# Patient Record
Sex: MALE | Race: WHITE | NOT HISPANIC OR LATINO | URBAN - METROPOLITAN AREA
[De-identification: names, ages, dates, MRNs, and addresses within clinical notes are randomized per-mention and may not be internally consistent; named-entity substitution may affect disease eponyms.]

---

## 2017-01-13 ENCOUNTER — AMBULATORY - HEALTHEAST (OUTPATIENT)
Dept: LAB | Facility: CLINIC | Age: 72
End: 2017-01-13

## 2017-01-13 ENCOUNTER — COMMUNICATION - HEALTHEAST (OUTPATIENT)
Dept: NURSING | Facility: CLINIC | Age: 72
End: 2017-01-13

## 2017-01-13 DIAGNOSIS — Q21.10 ASD (ATRIAL SEPTAL DEFECT): ICD-10-CM

## 2017-01-13 DIAGNOSIS — I48.91 ATRIAL FIBRILLATION (H): ICD-10-CM

## 2017-01-13 DIAGNOSIS — I48.91 A-FIB (H): ICD-10-CM

## 2017-02-28 ENCOUNTER — COMMUNICATION - HEALTHEAST (OUTPATIENT)
Dept: NURSING | Facility: CLINIC | Age: 72
End: 2017-02-28

## 2017-02-28 ENCOUNTER — COMMUNICATION - HEALTHEAST (OUTPATIENT)
Dept: TELEHEALTH | Facility: CLINIC | Age: 72
End: 2017-02-28

## 2017-02-28 ENCOUNTER — AMBULATORY - HEALTHEAST (OUTPATIENT)
Dept: LAB | Facility: CLINIC | Age: 72
End: 2017-02-28

## 2017-02-28 DIAGNOSIS — I48.91 A-FIB (H): ICD-10-CM

## 2017-02-28 DIAGNOSIS — I48.91 ATRIAL FIBRILLATION (H): ICD-10-CM

## 2017-02-28 DIAGNOSIS — Q21.10 ASD (ATRIAL SEPTAL DEFECT): ICD-10-CM

## 2017-04-18 ENCOUNTER — COMMUNICATION - HEALTHEAST (OUTPATIENT)
Dept: NURSING | Facility: CLINIC | Age: 72
End: 2017-04-18

## 2017-04-19 ENCOUNTER — AMBULATORY - HEALTHEAST (OUTPATIENT)
Dept: LAB | Facility: CLINIC | Age: 72
End: 2017-04-19

## 2017-04-19 ENCOUNTER — COMMUNICATION - HEALTHEAST (OUTPATIENT)
Dept: NURSING | Facility: CLINIC | Age: 72
End: 2017-04-19

## 2017-04-19 DIAGNOSIS — Q21.10 ASD (ATRIAL SEPTAL DEFECT): ICD-10-CM

## 2017-04-19 DIAGNOSIS — I48.21 PERMANENT ATRIAL FIBRILLATION (H): ICD-10-CM

## 2017-04-19 DIAGNOSIS — I48.91 ATRIAL FIBRILLATION (H): ICD-10-CM

## 2017-05-02 ENCOUNTER — COMMUNICATION - HEALTHEAST (OUTPATIENT)
Dept: NURSING | Facility: CLINIC | Age: 72
End: 2017-05-02

## 2017-05-02 DIAGNOSIS — K55.059 ACUTE VASCULAR INSUFFICIENCY OF INTESTINE (H): ICD-10-CM

## 2017-05-02 DIAGNOSIS — I48.20 CHRONIC ATRIAL FIBRILLATION (H): ICD-10-CM

## 2017-06-07 ENCOUNTER — COMMUNICATION - HEALTHEAST (OUTPATIENT)
Dept: NURSING | Facility: CLINIC | Age: 72
End: 2017-06-07

## 2017-06-20 ENCOUNTER — COMMUNICATION - HEALTHEAST (OUTPATIENT)
Dept: FAMILY MEDICINE | Facility: CLINIC | Age: 72
End: 2017-06-20

## 2017-06-20 ENCOUNTER — AMBULATORY - HEALTHEAST (OUTPATIENT)
Dept: LAB | Facility: CLINIC | Age: 72
End: 2017-06-20

## 2017-06-20 DIAGNOSIS — I48.20 CHRONIC ATRIAL FIBRILLATION (H): ICD-10-CM

## 2017-06-20 DIAGNOSIS — K55.059 ACUTE VASCULAR INSUFFICIENCY OF INTESTINE (H): ICD-10-CM

## 2017-06-20 DIAGNOSIS — I48.21 PERMANENT ATRIAL FIBRILLATION (H): ICD-10-CM

## 2017-06-20 DIAGNOSIS — Q21.10 ASD (ATRIAL SEPTAL DEFECT): ICD-10-CM

## 2017-08-08 ENCOUNTER — COMMUNICATION - HEALTHEAST (OUTPATIENT)
Dept: NURSING | Facility: CLINIC | Age: 72
End: 2017-08-08

## 2017-08-15 ENCOUNTER — COMMUNICATION - HEALTHEAST (OUTPATIENT)
Dept: NURSING | Facility: CLINIC | Age: 72
End: 2017-08-15

## 2017-08-15 ENCOUNTER — AMBULATORY - HEALTHEAST (OUTPATIENT)
Dept: LAB | Facility: CLINIC | Age: 72
End: 2017-08-15

## 2017-08-15 DIAGNOSIS — I48.21 PERMANENT ATRIAL FIBRILLATION (H): ICD-10-CM

## 2017-08-15 DIAGNOSIS — I48.20 CHRONIC ATRIAL FIBRILLATION (H): ICD-10-CM

## 2017-08-15 DIAGNOSIS — K55.059 ACUTE VASCULAR INSUFFICIENCY OF INTESTINE (H): ICD-10-CM

## 2017-08-15 DIAGNOSIS — Q21.10 ASD (ATRIAL SEPTAL DEFECT): ICD-10-CM

## 2017-09-07 ENCOUNTER — COMMUNICATION - HEALTHEAST (OUTPATIENT)
Dept: NURSING | Facility: CLINIC | Age: 72
End: 2017-09-07

## 2017-09-13 ENCOUNTER — COMMUNICATION - HEALTHEAST (OUTPATIENT)
Dept: NURSING | Facility: CLINIC | Age: 72
End: 2017-09-13

## 2017-09-13 ENCOUNTER — AMBULATORY - HEALTHEAST (OUTPATIENT)
Dept: LAB | Facility: CLINIC | Age: 72
End: 2017-09-13

## 2017-09-13 DIAGNOSIS — I48.21 PERMANENT ATRIAL FIBRILLATION (H): ICD-10-CM

## 2017-09-13 DIAGNOSIS — I48.20 CHRONIC ATRIAL FIBRILLATION (H): ICD-10-CM

## 2017-09-13 DIAGNOSIS — Q21.10 ASD (ATRIAL SEPTAL DEFECT): ICD-10-CM

## 2017-09-13 DIAGNOSIS — K55.059 ACUTE VASCULAR INSUFFICIENCY OF INTESTINE (H): ICD-10-CM

## 2017-11-01 ENCOUNTER — COMMUNICATION - HEALTHEAST (OUTPATIENT)
Dept: NURSING | Facility: CLINIC | Age: 72
End: 2017-11-01

## 2017-11-03 ENCOUNTER — AMBULATORY - HEALTHEAST (OUTPATIENT)
Dept: LAB | Facility: CLINIC | Age: 72
End: 2017-11-03

## 2017-11-03 ENCOUNTER — COMMUNICATION - HEALTHEAST (OUTPATIENT)
Dept: NURSING | Facility: CLINIC | Age: 72
End: 2017-11-03

## 2017-11-03 DIAGNOSIS — I48.20 CHRONIC ATRIAL FIBRILLATION (H): ICD-10-CM

## 2017-11-03 DIAGNOSIS — I48.21 PERMANENT ATRIAL FIBRILLATION (H): ICD-10-CM

## 2017-11-03 DIAGNOSIS — K55.059 ACUTE VASCULAR INSUFFICIENCY OF INTESTINE (H): ICD-10-CM

## 2017-11-03 DIAGNOSIS — Q21.10 ASD (ATRIAL SEPTAL DEFECT): ICD-10-CM

## 2017-12-01 ENCOUNTER — OFFICE VISIT - HEALTHEAST (OUTPATIENT)
Dept: INTERNAL MEDICINE | Facility: CLINIC | Age: 72
End: 2017-12-01

## 2017-12-01 ENCOUNTER — COMMUNICATION - HEALTHEAST (OUTPATIENT)
Dept: NURSING | Facility: CLINIC | Age: 72
End: 2017-12-01

## 2017-12-01 DIAGNOSIS — D69.6 THROMBOCYTOPENIA (H): ICD-10-CM

## 2017-12-01 DIAGNOSIS — R53.83 FATIGUE, UNSPECIFIED TYPE: ICD-10-CM

## 2017-12-01 DIAGNOSIS — Z00.00 HEALTH CARE MAINTENANCE: ICD-10-CM

## 2017-12-01 DIAGNOSIS — Z00.01 ENCOUNTER FOR GENERAL ADULT MEDICAL EXAMINATION WITH ABNORMAL FINDINGS: ICD-10-CM

## 2017-12-01 DIAGNOSIS — I48.20 CHRONIC ATRIAL FIBRILLATION (H): ICD-10-CM

## 2017-12-01 DIAGNOSIS — Z85.828 HISTORY OF SKIN CANCER: ICD-10-CM

## 2017-12-01 DIAGNOSIS — Q21.10 ASD (ATRIAL SEPTAL DEFECT): ICD-10-CM

## 2017-12-01 DIAGNOSIS — Z51.81 MEDICATION MONITORING ENCOUNTER: ICD-10-CM

## 2017-12-01 DIAGNOSIS — Z12.5 PROSTATE CANCER SCREENING: ICD-10-CM

## 2017-12-01 DIAGNOSIS — I10 HTN (HYPERTENSION): ICD-10-CM

## 2017-12-01 DIAGNOSIS — K55.059 ACUTE VASCULAR INSUFFICIENCY OF INTESTINE (H): ICD-10-CM

## 2017-12-01 DIAGNOSIS — E11.9 CONTROLLED TYPE 2 DIABETES MELLITUS WITHOUT COMPLICATION, WITHOUT LONG-TERM CURRENT USE OF INSULIN (H): ICD-10-CM

## 2017-12-01 DIAGNOSIS — Z87.39 HISTORY OF GOUT: ICD-10-CM

## 2017-12-01 LAB
CHOLEST SERPL-MCNC: 196 MG/DL
FASTING STATUS PATIENT QL REPORTED: YES
HBA1C MFR BLD: 6.8 % (ref 3.5–6)
HDLC SERPL-MCNC: 43 MG/DL
LDLC SERPL CALC-MCNC: 135 MG/DL
PSA SERPL-MCNC: 1 NG/ML (ref 0–6.5)
TRIGL SERPL-MCNC: 92 MG/DL

## 2017-12-01 ASSESSMENT — MIFFLIN-ST. JEOR: SCORE: 1852.1

## 2017-12-04 ENCOUNTER — COMMUNICATION - HEALTHEAST (OUTPATIENT)
Dept: INTERNAL MEDICINE | Facility: CLINIC | Age: 72
End: 2017-12-04

## 2018-01-09 ENCOUNTER — COMMUNICATION - HEALTHEAST (OUTPATIENT)
Dept: NURSING | Facility: CLINIC | Age: 73
End: 2018-01-09

## 2018-01-12 ENCOUNTER — COMMUNICATION - HEALTHEAST (OUTPATIENT)
Dept: INTERNAL MEDICINE | Facility: CLINIC | Age: 73
End: 2018-01-12

## 2018-01-12 ENCOUNTER — AMBULATORY - HEALTHEAST (OUTPATIENT)
Dept: LAB | Facility: CLINIC | Age: 73
End: 2018-01-12

## 2018-01-12 DIAGNOSIS — I48.20 CHRONIC ATRIAL FIBRILLATION (H): ICD-10-CM

## 2018-01-12 DIAGNOSIS — Q21.10 ASD (ATRIAL SEPTAL DEFECT): ICD-10-CM

## 2018-01-12 DIAGNOSIS — K55.059 ACUTE VASCULAR INSUFFICIENCY OF INTESTINE (H): ICD-10-CM

## 2018-01-12 LAB — INR PPP: 1.7 (ref 0.9–1.1)

## 2018-01-30 ENCOUNTER — AMBULATORY - HEALTHEAST (OUTPATIENT)
Dept: LAB | Facility: CLINIC | Age: 73
End: 2018-01-30

## 2018-01-30 ENCOUNTER — COMMUNICATION - HEALTHEAST (OUTPATIENT)
Dept: INTERNAL MEDICINE | Facility: CLINIC | Age: 73
End: 2018-01-30

## 2018-01-30 DIAGNOSIS — K55.059 ACUTE VASCULAR INSUFFICIENCY OF INTESTINE (H): ICD-10-CM

## 2018-01-30 DIAGNOSIS — Q21.10 ASD (ATRIAL SEPTAL DEFECT): ICD-10-CM

## 2018-01-30 DIAGNOSIS — I48.20 CHRONIC ATRIAL FIBRILLATION (H): ICD-10-CM

## 2018-01-30 LAB — INR PPP: 2 (ref 0.9–1.1)

## 2018-02-28 ENCOUNTER — COMMUNICATION - HEALTHEAST (OUTPATIENT)
Dept: INTERNAL MEDICINE | Facility: CLINIC | Age: 73
End: 2018-02-28

## 2018-03-05 ENCOUNTER — COMMUNICATION - HEALTHEAST (OUTPATIENT)
Dept: INTERNAL MEDICINE | Facility: CLINIC | Age: 73
End: 2018-03-05

## 2018-03-05 ENCOUNTER — AMBULATORY - HEALTHEAST (OUTPATIENT)
Dept: LAB | Facility: CLINIC | Age: 73
End: 2018-03-05

## 2018-03-05 DIAGNOSIS — I48.20 CHRONIC ATRIAL FIBRILLATION (H): ICD-10-CM

## 2018-03-05 DIAGNOSIS — K55.059 ACUTE VASCULAR INSUFFICIENCY OF INTESTINE (H): ICD-10-CM

## 2018-03-05 DIAGNOSIS — Q21.10 ASD (ATRIAL SEPTAL DEFECT): ICD-10-CM

## 2018-03-05 LAB — INR PPP: 3.3 (ref 0.9–1.1)

## 2018-03-15 ENCOUNTER — COMMUNICATION - HEALTHEAST (OUTPATIENT)
Dept: INTERNAL MEDICINE | Facility: CLINIC | Age: 73
End: 2018-03-15

## 2018-03-15 DIAGNOSIS — I48.91 ATRIAL FIBRILLATION (H): ICD-10-CM

## 2018-04-10 ENCOUNTER — COMMUNICATION - HEALTHEAST (OUTPATIENT)
Dept: ANTICOAGULATION | Facility: CLINIC | Age: 73
End: 2018-04-10

## 2018-04-11 ENCOUNTER — AMBULATORY - HEALTHEAST (OUTPATIENT)
Dept: LAB | Facility: CLINIC | Age: 73
End: 2018-04-11

## 2018-04-11 ENCOUNTER — COMMUNICATION - HEALTHEAST (OUTPATIENT)
Dept: ANTICOAGULATION | Facility: CLINIC | Age: 73
End: 2018-04-11

## 2018-04-11 DIAGNOSIS — I48.91 ATRIAL FIBRILLATION (H): ICD-10-CM

## 2018-04-11 DIAGNOSIS — Q21.10 ASD (ATRIAL SEPTAL DEFECT): ICD-10-CM

## 2018-04-11 LAB — INR PPP: 2.5 (ref 0.9–1.1)

## 2018-05-17 ENCOUNTER — COMMUNICATION - HEALTHEAST (OUTPATIENT)
Dept: INTERNAL MEDICINE | Facility: CLINIC | Age: 73
End: 2018-05-17

## 2018-05-21 ENCOUNTER — COMMUNICATION - HEALTHEAST (OUTPATIENT)
Dept: INTERNAL MEDICINE | Facility: CLINIC | Age: 73
End: 2018-05-21

## 2018-05-21 ENCOUNTER — AMBULATORY - HEALTHEAST (OUTPATIENT)
Dept: LAB | Facility: CLINIC | Age: 73
End: 2018-05-21

## 2018-05-21 DIAGNOSIS — Q21.10 ASD (ATRIAL SEPTAL DEFECT): ICD-10-CM

## 2018-05-21 DIAGNOSIS — I48.91 ATRIAL FIBRILLATION (H): ICD-10-CM

## 2018-05-21 LAB — INR PPP: 2.9 (ref 0.9–1.1)

## 2018-06-25 ENCOUNTER — COMMUNICATION - HEALTHEAST (OUTPATIENT)
Dept: INTERNAL MEDICINE | Facility: CLINIC | Age: 73
End: 2018-06-25

## 2018-06-29 ENCOUNTER — AMBULATORY - HEALTHEAST (OUTPATIENT)
Dept: LAB | Facility: CLINIC | Age: 73
End: 2018-06-29

## 2018-06-29 ENCOUNTER — COMMUNICATION - HEALTHEAST (OUTPATIENT)
Dept: ANTICOAGULATION | Facility: CLINIC | Age: 73
End: 2018-06-29

## 2018-06-29 DIAGNOSIS — Q21.10 ASD (ATRIAL SEPTAL DEFECT): ICD-10-CM

## 2018-06-29 DIAGNOSIS — I48.91 ATRIAL FIBRILLATION (H): ICD-10-CM

## 2018-06-29 LAB — INR PPP: 3.1 (ref 0.9–1.1)

## 2018-07-23 ENCOUNTER — RECORDS - HEALTHEAST (OUTPATIENT)
Dept: ADMINISTRATIVE | Facility: OTHER | Age: 73
End: 2018-07-23

## 2018-07-25 ENCOUNTER — RECORDS - HEALTHEAST (OUTPATIENT)
Dept: ADMINISTRATIVE | Facility: OTHER | Age: 73
End: 2018-07-25

## 2018-07-27 ENCOUNTER — COMMUNICATION - HEALTHEAST (OUTPATIENT)
Dept: ANTICOAGULATION | Facility: CLINIC | Age: 73
End: 2018-07-27

## 2018-07-27 ENCOUNTER — AMBULATORY - HEALTHEAST (OUTPATIENT)
Dept: LAB | Facility: CLINIC | Age: 73
End: 2018-07-27

## 2018-07-27 DIAGNOSIS — Q21.10 ASD (ATRIAL SEPTAL DEFECT): ICD-10-CM

## 2018-07-27 DIAGNOSIS — I48.91 A-FIB (H): ICD-10-CM

## 2018-07-27 DIAGNOSIS — I48.91 ATRIAL FIBRILLATION (H): ICD-10-CM

## 2018-07-27 LAB — INR PPP: 2 (ref 0.9–1.1)

## 2018-08-24 ENCOUNTER — AMBULATORY - HEALTHEAST (OUTPATIENT)
Dept: LAB | Facility: CLINIC | Age: 73
End: 2018-08-24

## 2018-08-24 ENCOUNTER — COMMUNICATION - HEALTHEAST (OUTPATIENT)
Dept: ANTICOAGULATION | Facility: CLINIC | Age: 73
End: 2018-08-24

## 2018-08-24 DIAGNOSIS — I48.91 ATRIAL FIBRILLATION (H): ICD-10-CM

## 2018-08-24 DIAGNOSIS — Q21.10 ASD (ATRIAL SEPTAL DEFECT): ICD-10-CM

## 2018-08-24 LAB — INR PPP: 2.3 (ref 0.9–1.1)

## 2018-10-10 ENCOUNTER — AMBULATORY - HEALTHEAST (OUTPATIENT)
Dept: LAB | Facility: CLINIC | Age: 73
End: 2018-10-10

## 2018-10-10 ENCOUNTER — COMMUNICATION - HEALTHEAST (OUTPATIENT)
Dept: ANTICOAGULATION | Facility: CLINIC | Age: 73
End: 2018-10-10

## 2018-10-10 DIAGNOSIS — I48.91 ATRIAL FIBRILLATION (H): ICD-10-CM

## 2018-10-10 DIAGNOSIS — Q21.10 ASD (ATRIAL SEPTAL DEFECT): ICD-10-CM

## 2018-10-10 LAB — INR PPP: 2.2 (ref 0.9–1.1)

## 2018-11-28 ENCOUNTER — COMMUNICATION - HEALTHEAST (OUTPATIENT)
Dept: ANTICOAGULATION | Facility: CLINIC | Age: 73
End: 2018-11-28

## 2018-12-06 ENCOUNTER — OFFICE VISIT - HEALTHEAST (OUTPATIENT)
Dept: INTERNAL MEDICINE | Facility: CLINIC | Age: 73
End: 2018-12-06

## 2018-12-06 ENCOUNTER — COMMUNICATION - HEALTHEAST (OUTPATIENT)
Dept: ANTICOAGULATION | Facility: CLINIC | Age: 73
End: 2018-12-06

## 2018-12-06 DIAGNOSIS — E11.9 CONTROLLED TYPE 2 DIABETES MELLITUS WITHOUT COMPLICATION, WITHOUT LONG-TERM CURRENT USE OF INSULIN (H): ICD-10-CM

## 2018-12-06 DIAGNOSIS — Z85.828 HISTORY OF SKIN CANCER: ICD-10-CM

## 2018-12-06 DIAGNOSIS — I48.91 ATRIAL FIBRILLATION (H): ICD-10-CM

## 2018-12-06 DIAGNOSIS — I10 HTN (HYPERTENSION): ICD-10-CM

## 2018-12-06 DIAGNOSIS — Z00.00 ROUTINE GENERAL MEDICAL EXAMINATION AT A HEALTH CARE FACILITY: ICD-10-CM

## 2018-12-06 DIAGNOSIS — Q21.10 ASD (ATRIAL SEPTAL DEFECT): ICD-10-CM

## 2018-12-06 DIAGNOSIS — I48.0 PAROXYSMAL ATRIAL FIBRILLATION (H): ICD-10-CM

## 2018-12-06 DIAGNOSIS — Z51.81 MEDICATION MONITORING ENCOUNTER: ICD-10-CM

## 2018-12-06 DIAGNOSIS — Z87.39 HISTORY OF GOUT: ICD-10-CM

## 2018-12-06 DIAGNOSIS — Z00.00 HEALTHCARE MAINTENANCE: ICD-10-CM

## 2018-12-06 DIAGNOSIS — Z12.5 PROSTATE CANCER SCREENING: ICD-10-CM

## 2018-12-06 LAB
ALBUMIN SERPL-MCNC: 3.6 G/DL (ref 3.5–5)
ALP SERPL-CCNC: 46 U/L (ref 45–120)
ALT SERPL W P-5'-P-CCNC: 32 U/L (ref 0–45)
ANION GAP SERPL CALCULATED.3IONS-SCNC: 11 MMOL/L (ref 5–18)
AST SERPL W P-5'-P-CCNC: 23 U/L (ref 0–40)
BILIRUB SERPL-MCNC: 0.9 MG/DL (ref 0–1)
BUN SERPL-MCNC: 18 MG/DL (ref 8–28)
CALCIUM SERPL-MCNC: 9.1 MG/DL (ref 8.5–10.5)
CHLORIDE BLD-SCNC: 101 MMOL/L (ref 98–107)
CHOLEST SERPL-MCNC: 179 MG/DL
CO2 SERPL-SCNC: 26 MMOL/L (ref 22–31)
CREAT SERPL-MCNC: 0.82 MG/DL (ref 0.7–1.3)
CREAT UR-MCNC: 89.6 MG/DL
ERYTHROCYTE [DISTWIDTH] IN BLOOD BY AUTOMATED COUNT: 11.8 % (ref 11–14.5)
FASTING STATUS PATIENT QL REPORTED: YES
GFR SERPL CREATININE-BSD FRML MDRD: >60 ML/MIN/1.73M2
GLUCOSE BLD-MCNC: 135 MG/DL (ref 70–125)
HBA1C MFR BLD: 7.5 % (ref 3.5–6)
HCT VFR BLD AUTO: 40.6 % (ref 40–54)
HDLC SERPL-MCNC: 43 MG/DL
HGB BLD-MCNC: 13.8 G/DL (ref 14–18)
INR PPP: 2.5 (ref 0.9–1.1)
LDLC SERPL CALC-MCNC: 116 MG/DL
MCH RBC QN AUTO: 30.7 PG (ref 27–34)
MCHC RBC AUTO-ENTMCNC: 33.9 G/DL (ref 32–36)
MCV RBC AUTO: 90 FL (ref 80–100)
MICROALBUMIN UR-MCNC: <0.5 MG/DL (ref 0–1.99)
MICROALBUMIN/CREAT UR: NORMAL MG/G
PLATELET # BLD AUTO: 109 THOU/UL (ref 140–440)
PMV BLD AUTO: 7.7 FL (ref 7–10)
POTASSIUM BLD-SCNC: 4.1 MMOL/L (ref 3.5–5)
PROT SERPL-MCNC: 6.4 G/DL (ref 6–8)
PSA SERPL-MCNC: 1 NG/ML (ref 0–6.5)
RBC # BLD AUTO: 4.49 MILL/UL (ref 4.4–6.2)
SODIUM SERPL-SCNC: 138 MMOL/L (ref 136–145)
TRIGL SERPL-MCNC: 100 MG/DL
WBC: 4 THOU/UL (ref 4–11)

## 2018-12-06 ASSESSMENT — MIFFLIN-ST. JEOR: SCORE: 1883.29

## 2018-12-07 ENCOUNTER — COMMUNICATION - HEALTHEAST (OUTPATIENT)
Dept: INTERNAL MEDICINE | Facility: CLINIC | Age: 73
End: 2018-12-07

## 2019-01-23 ENCOUNTER — AMBULATORY - HEALTHEAST (OUTPATIENT)
Dept: LAB | Facility: CLINIC | Age: 74
End: 2019-01-23

## 2019-01-23 ENCOUNTER — COMMUNICATION - HEALTHEAST (OUTPATIENT)
Dept: ANTICOAGULATION | Facility: CLINIC | Age: 74
End: 2019-01-23

## 2019-01-23 DIAGNOSIS — Q21.10 ASD (ATRIAL SEPTAL DEFECT): ICD-10-CM

## 2019-01-23 DIAGNOSIS — I48.91 ATRIAL FIBRILLATION (H): ICD-10-CM

## 2019-01-23 LAB — INR PPP: 1.9 (ref 0.9–1.1)

## 2019-02-11 ENCOUNTER — COMMUNICATION - HEALTHEAST (OUTPATIENT)
Dept: ANTICOAGULATION | Facility: CLINIC | Age: 74
End: 2019-02-11

## 2019-02-11 DIAGNOSIS — I48.91 ATRIAL FIBRILLATION (H): ICD-10-CM

## 2019-02-13 ENCOUNTER — COMMUNICATION - HEALTHEAST (OUTPATIENT)
Dept: ANTICOAGULATION | Facility: CLINIC | Age: 74
End: 2019-02-13

## 2019-02-25 ENCOUNTER — COMMUNICATION - HEALTHEAST (OUTPATIENT)
Dept: ANTICOAGULATION | Facility: CLINIC | Age: 74
End: 2019-02-25

## 2019-02-25 ENCOUNTER — AMBULATORY - HEALTHEAST (OUTPATIENT)
Dept: LAB | Facility: CLINIC | Age: 74
End: 2019-02-25

## 2019-02-25 DIAGNOSIS — Q21.10 ASD (ATRIAL SEPTAL DEFECT): ICD-10-CM

## 2019-02-25 DIAGNOSIS — I48.91 ATRIAL FIBRILLATION (H): ICD-10-CM

## 2019-02-25 LAB — INR PPP: 2.2 (ref 0.9–1.1)

## 2019-03-28 ENCOUNTER — COMMUNICATION - HEALTHEAST (OUTPATIENT)
Dept: INTERNAL MEDICINE | Facility: CLINIC | Age: 74
End: 2019-03-28

## 2019-03-28 ENCOUNTER — AMBULATORY - HEALTHEAST (OUTPATIENT)
Dept: LAB | Facility: CLINIC | Age: 74
End: 2019-03-28

## 2019-03-28 DIAGNOSIS — I48.91 ATRIAL FIBRILLATION (H): ICD-10-CM

## 2019-03-28 DIAGNOSIS — Q21.10 ASD (ATRIAL SEPTAL DEFECT): ICD-10-CM

## 2019-03-28 LAB — INR PPP: 2.2 (ref 0.9–1.1)

## 2019-05-02 ENCOUNTER — COMMUNICATION - HEALTHEAST (OUTPATIENT)
Dept: ANTICOAGULATION | Facility: CLINIC | Age: 74
End: 2019-05-02

## 2019-05-10 ENCOUNTER — COMMUNICATION - HEALTHEAST (OUTPATIENT)
Dept: ANTICOAGULATION | Facility: CLINIC | Age: 74
End: 2019-05-10

## 2019-05-10 ENCOUNTER — AMBULATORY - HEALTHEAST (OUTPATIENT)
Dept: LAB | Facility: CLINIC | Age: 74
End: 2019-05-10

## 2019-05-10 DIAGNOSIS — Q21.10 ASD (ATRIAL SEPTAL DEFECT): ICD-10-CM

## 2019-05-10 DIAGNOSIS — I48.91 ATRIAL FIBRILLATION (H): ICD-10-CM

## 2019-05-10 LAB — INR PPP: 2.4 (ref 0.9–1.1)

## 2019-05-15 ENCOUNTER — OFFICE VISIT - HEALTHEAST (OUTPATIENT)
Dept: INTERNAL MEDICINE | Facility: CLINIC | Age: 74
End: 2019-05-15

## 2019-05-15 ENCOUNTER — COMMUNICATION - HEALTHEAST (OUTPATIENT)
Dept: INTERNAL MEDICINE | Facility: CLINIC | Age: 74
End: 2019-05-15

## 2019-05-15 DIAGNOSIS — Z51.81 MEDICATION MONITORING ENCOUNTER: ICD-10-CM

## 2019-05-15 DIAGNOSIS — Z85.828 HISTORY OF SKIN CANCER: ICD-10-CM

## 2019-05-15 DIAGNOSIS — E11.9 CONTROLLED TYPE 2 DIABETES MELLITUS WITHOUT COMPLICATION, WITHOUT LONG-TERM CURRENT USE OF INSULIN (H): ICD-10-CM

## 2019-05-15 DIAGNOSIS — I48.0 PAROXYSMAL ATRIAL FIBRILLATION (H): ICD-10-CM

## 2019-05-15 DIAGNOSIS — I10 ESSENTIAL HYPERTENSION: ICD-10-CM

## 2019-05-15 LAB
ANION GAP SERPL CALCULATED.3IONS-SCNC: 9 MMOL/L (ref 5–18)
BUN SERPL-MCNC: 21 MG/DL (ref 8–28)
CALCIUM SERPL-MCNC: 9.1 MG/DL (ref 8.5–10.5)
CHLORIDE BLD-SCNC: 100 MMOL/L (ref 98–107)
CO2 SERPL-SCNC: 25 MMOL/L (ref 22–31)
CREAT SERPL-MCNC: 0.86 MG/DL (ref 0.7–1.3)
GFR SERPL CREATININE-BSD FRML MDRD: >60 ML/MIN/1.73M2
GLUCOSE BLD-MCNC: 130 MG/DL (ref 70–125)
HBA1C MFR BLD: 7 % (ref 3.5–6)
POTASSIUM BLD-SCNC: 4.1 MMOL/L (ref 3.5–5)
SODIUM SERPL-SCNC: 134 MMOL/L (ref 136–145)

## 2019-06-24 ENCOUNTER — AMBULATORY - HEALTHEAST (OUTPATIENT)
Dept: LAB | Facility: CLINIC | Age: 74
End: 2019-06-24

## 2019-06-24 ENCOUNTER — COMMUNICATION - HEALTHEAST (OUTPATIENT)
Dept: ANTICOAGULATION | Facility: CLINIC | Age: 74
End: 2019-06-24

## 2019-06-24 DIAGNOSIS — I48.91 ATRIAL FIBRILLATION (H): ICD-10-CM

## 2019-06-24 DIAGNOSIS — Q21.10 ASD (ATRIAL SEPTAL DEFECT): ICD-10-CM

## 2019-06-24 LAB — INR PPP: 3.3 (ref 0.9–1.1)

## 2019-07-15 ENCOUNTER — COMMUNICATION - HEALTHEAST (OUTPATIENT)
Dept: ANTICOAGULATION | Facility: CLINIC | Age: 74
End: 2019-07-15

## 2019-07-23 ENCOUNTER — COMMUNICATION - HEALTHEAST (OUTPATIENT)
Dept: ANTICOAGULATION | Facility: CLINIC | Age: 74
End: 2019-07-23

## 2019-07-23 ENCOUNTER — AMBULATORY - HEALTHEAST (OUTPATIENT)
Dept: LAB | Facility: CLINIC | Age: 74
End: 2019-07-23

## 2019-07-23 DIAGNOSIS — Q21.10 ASD (ATRIAL SEPTAL DEFECT): ICD-10-CM

## 2019-07-23 DIAGNOSIS — I48.91 ATRIAL FIBRILLATION (H): ICD-10-CM

## 2019-07-23 LAB — INR PPP: 2.5 (ref 0.9–1.1)

## 2019-08-06 ENCOUNTER — RECORDS - HEALTHEAST (OUTPATIENT)
Dept: ADMINISTRATIVE | Facility: OTHER | Age: 74
End: 2019-08-06

## 2019-08-09 ENCOUNTER — RECORDS - HEALTHEAST (OUTPATIENT)
Dept: ADMINISTRATIVE | Facility: OTHER | Age: 74
End: 2019-08-09

## 2019-08-23 ENCOUNTER — COMMUNICATION - HEALTHEAST (OUTPATIENT)
Dept: ANTICOAGULATION | Facility: CLINIC | Age: 74
End: 2019-08-23

## 2019-08-23 ENCOUNTER — AMBULATORY - HEALTHEAST (OUTPATIENT)
Dept: LAB | Facility: CLINIC | Age: 74
End: 2019-08-23

## 2019-08-23 DIAGNOSIS — I48.91 ATRIAL FIBRILLATION (H): ICD-10-CM

## 2019-08-23 DIAGNOSIS — Q21.10 ASD (ATRIAL SEPTAL DEFECT): ICD-10-CM

## 2019-08-23 LAB — INR PPP: 2.3 (ref 0.9–1.1)

## 2019-09-04 ENCOUNTER — COMMUNICATION - HEALTHEAST (OUTPATIENT)
Dept: INTERNAL MEDICINE | Facility: CLINIC | Age: 74
End: 2019-09-04

## 2019-09-24 ENCOUNTER — AMBULATORY - HEALTHEAST (OUTPATIENT)
Dept: LAB | Facility: CLINIC | Age: 74
End: 2019-09-24

## 2019-09-24 ENCOUNTER — COMMUNICATION - HEALTHEAST (OUTPATIENT)
Dept: ANTICOAGULATION | Facility: CLINIC | Age: 74
End: 2019-09-24

## 2019-09-24 DIAGNOSIS — I48.91 ATRIAL FIBRILLATION (H): ICD-10-CM

## 2019-09-24 DIAGNOSIS — Q21.10 ASD (ATRIAL SEPTAL DEFECT): ICD-10-CM

## 2019-09-24 LAB — INR PPP: 2.3 (ref 0.9–1.1)

## 2019-10-01 ENCOUNTER — RECORDS - HEALTHEAST (OUTPATIENT)
Dept: ADMINISTRATIVE | Facility: OTHER | Age: 74
End: 2019-10-01

## 2019-10-01 ENCOUNTER — OFFICE VISIT - HEALTHEAST (OUTPATIENT)
Dept: INTERNAL MEDICINE | Facility: CLINIC | Age: 74
End: 2019-10-01

## 2019-10-01 DIAGNOSIS — I10 HTN (HYPERTENSION): ICD-10-CM

## 2019-10-01 DIAGNOSIS — I48.0 PAROXYSMAL ATRIAL FIBRILLATION (H): ICD-10-CM

## 2019-10-01 DIAGNOSIS — E11.9 CONTROLLED TYPE 2 DIABETES MELLITUS WITHOUT COMPLICATION, WITHOUT LONG-TERM CURRENT USE OF INSULIN (H): ICD-10-CM

## 2019-10-01 DIAGNOSIS — Z87.39 HISTORY OF GOUT: ICD-10-CM

## 2019-10-01 RX ORDER — LISINOPRIL 10 MG/1
10 TABLET ORAL 2 TIMES DAILY
Qty: 180 TABLET | Refills: 3 | Status: SHIPPED | OUTPATIENT
Start: 2019-10-01

## 2019-10-01 RX ORDER — HYDROCHLOROTHIAZIDE 25 MG/1
TABLET ORAL
Qty: 90 TABLET | Refills: 3 | Status: SHIPPED | OUTPATIENT
Start: 2019-10-01

## 2019-10-01 RX ORDER — POTASSIUM CHLORIDE 1500 MG/1
TABLET, EXTENDED RELEASE ORAL
Qty: 90 TABLET | Refills: 3 | Status: SHIPPED | OUTPATIENT
Start: 2019-10-01

## 2019-10-01 RX ORDER — WARFARIN SODIUM 5 MG/1
TABLET ORAL
Qty: 90 TABLET | Refills: 0 | Status: SHIPPED | OUTPATIENT
Start: 2019-10-01

## 2019-10-01 RX ORDER — ALLOPURINOL 100 MG/1
100 TABLET ORAL DAILY
Qty: 180 TABLET | Refills: 3 | Status: SHIPPED | OUTPATIENT
Start: 2019-10-01

## 2019-10-07 ENCOUNTER — COMMUNICATION - HEALTHEAST (OUTPATIENT)
Dept: INTERNAL MEDICINE | Facility: CLINIC | Age: 74
End: 2019-10-07

## 2019-10-07 DIAGNOSIS — M79.673 HEEL PAIN, UNSPECIFIED LATERALITY: ICD-10-CM

## 2019-10-15 ENCOUNTER — RECORDS - HEALTHEAST (OUTPATIENT)
Dept: ADMINISTRATIVE | Facility: OTHER | Age: 74
End: 2019-10-15

## 2019-11-01 ENCOUNTER — COMMUNICATION - HEALTHEAST (OUTPATIENT)
Dept: ANTICOAGULATION | Facility: CLINIC | Age: 74
End: 2019-11-01

## 2019-12-23 ENCOUNTER — COMMUNICATION - HEALTHEAST (OUTPATIENT)
Dept: INTERNAL MEDICINE | Facility: CLINIC | Age: 74
End: 2019-12-23

## 2019-12-23 DIAGNOSIS — Q21.10 ASD (ATRIAL SEPTAL DEFECT): ICD-10-CM

## 2019-12-23 DIAGNOSIS — I48.91 A-FIB (H): ICD-10-CM

## 2021-05-27 NOTE — TELEPHONE ENCOUNTER
ANTICOAGULATION  MANAGEMENT    Assessment     Today's INR result of 2.2 is Therapeutic (goal INR of 2.0-3.0)        Warfarin taken as previously instructed    No new diet changes affecting INR    No new medication/supplements affecting INR    Continues to tolerate warfarin with no reported s/s of bleeding or thromboembolism     Previous INR was Therapeutic    Plan:     Spoke with Adrien regarding INR result and instructed:     Warfarin Dosing Instructions:  Continue current warfarin dose 2.5 mg daily on Mondays, Wednesdays and Fridays; and 5 mg daily rest of week  (0 % change)    Instructed patient to follow up no later than: one month.    Education provided: importance of therapeutic range, importance of following up for INR monitoring at instructed interval and importance of taking warfarin as instructed    Donald verbalizes understanding and agrees to warfarin dosing plan.    Instructed to call the [facility] for any changes, questions or concerns. (#[phone])   ?   Mary Alice Santamaria RN    Subjective/Objective:      Adrien Parra, a 73 y.o. male is on warfarin.     Adrien reports:     Home warfarin dose: verbally confirmed home dose with Donald and updated on anticoagulation calendar     Missed doses: No     Medication changes:  No     S/S of bleeding or thromboembolism:  No     New Injury or illness:  No     Changes in diet or alcohol consumption:  No: continued with the alcohol change that was discussed at last visit.     Upcoming surgery, procedure or cardioversion:  No    Anticoagulation Episode Summary     Current INR goal:   2.0-3.0   TTR:   76.2 % (4.2 y)   Next INR check:   4/25/2019   INR from last check:   2.20 (3/28/2019)   Weekly max warfarin dose:      Target end date:      INR check location:      Preferred lab:      Send INR reminders to:   ANTICOAGULATION POOL A (WBY,WBE,MID,RSC)    Indications    ASD (atrial septal defect) [Q21.1]  A-fib (H) [I48.91]           Comments:             Anticoagulation Care Providers     Provider Role Specialty Phone number    Pradeep Darden MD Referring Internal Medicine [phone]

## 2021-05-28 ENCOUNTER — RECORDS - HEALTHEAST (OUTPATIENT)
Dept: ADMINISTRATIVE | Facility: CLINIC | Age: 76
End: 2021-05-28

## 2021-05-28 NOTE — PROGRESS NOTES
[facility] clinic Follow Up Note    Adrien Parra   73 y.o. male    Date of Visit: 5/15/2019    Chief Complaint   Patient presents with     Paperwork     Subjective  Donald is here for follow-up on diabetes and hypertension and a history of paroxysmal atrial fibrillation.    He also had a form to fill out for an upcoming job to be closer to his grandchildren.    He has a past history of diabetes type 2 but has refused medication.  Refused statin drug.  He has worked on diet and has lost some weight over the winter.  He tries to walk on a regular basis.    He has not had any exertional shortness of breath or chest pain.    Back in 2005 he had an angiogram that was negative for coronary artery disease.    He did have a Maze procedure and atrial septal defect and tricuspid regurgitation repair in 2005.    He has some sick sinus and a pacemaker placed in 2013.    No history of stroke.    Fairly low burden of atrial fibrillation.  Occasional palpitations that are well-tolerated.    July 2018 cardiac echo report reviewed by me today with ejection fraction 61%.    No bleeding issues.    He did have an overnight oximetry study in 2018 that was negative for sleep apnea.    He is never smoked.    No new cough.    Hypertension is well controlled without orthostasis.    He said some chronic lymphedema, mainly of his left leg.  That stable.    He did bump his leg recently with a small laceration, but that is healing and not infected.  He has a Band-Aid on it.    Past history of skin cancer.  He was seen last fall and no new skin cancers.  Previous squamous cell cancer in situ.  His follow-up as this coming fall.    Status post cholecystectomy 2011.    Previous colon polyp.  2010 colonoscopy negative and now he refuses further colonoscopies.  Bowels are regular without blood.    He did see ophthalmology last September and no retinopathy.    No foot sores.  No falls.    The patient does have a DNR/DNI CODE  STATUS    PMHx:  No past medical history on file.  PSHx:  No past surgical history on file.  Immunizations:   Immunization History   Administered Date(s) Administered     Influenza, Seasonal, Inj PF IIV3 09/14/2012     Influenza, inj, historic,unspecified 10/05/2009, 10/04/2011, 09/18/2016     Influenza, seasonal,quad inj 6-35 mos 10/03/2010     Pneumo Conj 13-V (2010&after) 04/14/2015     Pneumo Polysac 23-V 10/03/2010     Td,adult,historic,unspecified 10/30/2009     Tdap 05/18/2014     ZOSTER, LIVE 01/01/2000       ROS A comprehensive review of systems was performed and was otherwise negative    Medications, allergies, and problem list were reviewed and updated    Exam  /78   Pulse 62   Wt (!) 247 lb (112 kg)   SpO2 98%   BMI 37.28 kg/m    Alert and oriented x3.  No jaundice.  Lungs are clear.  Heart is regular today with pacemaker occasional ectopy.  +1-2 lower extremity edema on the left.  Small laceration was showing signs of healing.  No secondary infection.  Abdomen is nontender.    Assessment/Plan  1. Controlled type 2 diabetes mellitus without complication, without long-term current use of insulin (H)  Does not check blood sugars.  Refuses metformin or medications.    Continue to work on diet and exercise.    Refuses statin drug.  - Glycosylated Hemoglobin A1c    Paperwork filled out for patient's work.  Scanned into chart.    2. Paroxysmal atrial fibrillation (H)  Low burden.  Minimally symptomatic.  Continue long-term warfarin.    3. Essential hypertension  Controlled.  Continue lisinopril and HCTZ with potassium supplement.    4. History of skin cancer  Routine yearly follow-up this fall    5. Medication monitoring encounter    - Basic Metabolic Panel    History of chronically low platelets.  No new bleeding issues.  Denies alcohol.    Return in about 4 months (around 9/15/2019) for Recheck.   Patient Instructions   No change in treatment plan.    Follow-up in September for checkup.    Pradeep PERES  MD Katalina      Current Outpatient Medications   Medication Sig Dispense Refill     allopurinol (ZYLOPRIM) 100 MG tablet Take 1 tablet (100 mg total) by mouth daily. 180 tablet 3     hydroCHLOROthiazide (HYDRODIURIL) 25 MG tablet TAKE 1 TABLET (25 MG TOTAL) BY MOUTH DAILY.. 90 tablet 3     lisinopril (PRINIVIL,ZESTRIL) 10 MG tablet Take 1 tablet (10 mg total) by mouth 2 (two) times a day. 180 tablet 3     potassium chloride (K-DUR,KLOR-CON) 20 MEQ tablet TAKE 1 TABLET BY MOUTH DAILY.. 90 tablet 3     triamcinolone (KENALOG) 0.1 % ointment Apply to affected areas twice a day 80 g 1     warfarin (COUMADIN/JANTOVEN) 5 MG tablet TAKE ONE TABLET BY MOUTH DAILY. ADJUST DOSE BASED ON INR RESULTS AS DIRECTED.. 90 tablet 3     No current facility-administered medications for this visit.      Allergies   Allergen Reactions     Tetracyclines      Venom-Honey Bee Unknown     Social History     Tobacco Use     Smoking status: Never Smoker     Smokeless tobacco: Never Used   Substance Use Topics     Alcohol use: Not on file     Drug use: Not on file

## 2021-05-28 NOTE — TELEPHONE ENCOUNTER
ANTICOAGULATION  MANAGEMENT    Assessment     Today's INR result of 2.4 is Therapeutic (goal INR of 2.0-3.0)        Warfarin taken as previously instructed    Change in alcohol intake may be affecting INR    No new medication/supplements affecting INR    Continues to tolerate warfarin with no reported s/s of bleeding or thromboembolism     Previous INR was Therapeutic    Plan:     Spoke with Adrien regarding INR result and instructed:     Warfarin Dosing Instructions:  Continue current warfarin dose 2.5 mg daily on Mondays, Wednesdays and Fridays; and 5 mg daily rest of week  (0 % change)    Instructed patient to follow up no later than: 4-6 weeks.    Education provided: importance of consistent vitamin K intake, impact of vitamin K foods on INR, potential interaction between warfarin and alcohol, importance of therapeutic range, importance of following up for INR monitoring at instructed interval and importance of taking warfarin as instructed    Donald verbalizes understanding and agrees to warfarin dosing plan.    Instructed to call the [facility] for any changes, questions or concerns. (#[phone])   ?   Mary Alice Santamaria RN    Subjective/Objective:      Adrien Parra, a 73 y.o. male is on warfarin.     Adrien reports:     Home warfarin dose: verbally confirmed home dose with Donald and updated on anticoagulation calendar     Missed doses: No     Medication changes:  No     S/S of bleeding or thromboembolism:  No     New Injury or illness:  No     Changes in diet or alcohol consumption:  Yes: He has been drinking less alcohol and eating less greens. He is trying to do better tho get his blood sugars back in check.     Upcoming surgery, procedure or cardioversion:  No    Anticoagulation Episode Summary     Current INR goal:   2.0-3.0   TTR:   76.8 % (4.3 y)   Next INR check:   6/21/2019   INR from last check:   2.40 (5/10/2019)   Weekly max warfarin dose:      Target end date:      INR check location:       Preferred lab:      Send INR reminders to:   ANTICOAGULATION POOL A (WBY,WBE,MID,RSC)    Indications    ASD (atrial septal defect) [Q21.1]  A-fib (H) [I48.91]           Comments:            Anticoagulation Care Providers     Provider Role Specialty Phone number    Pradeep Darden MD Referring Internal Medicine [phone]

## 2021-05-28 NOTE — TELEPHONE ENCOUNTER
ANTICOAGULATION  MANAGEMENT PROGRAM    Adrien FESTUS Parra is overdue for INR check.  Reminder call made.    Spoke with Donald and scheduled INR appointment on 5/6 .    Ivett Dove, RN

## 2021-05-29 ENCOUNTER — RECORDS - HEALTHEAST (OUTPATIENT)
Dept: ADMINISTRATIVE | Facility: CLINIC | Age: 76
End: 2021-05-29

## 2021-05-29 NOTE — TELEPHONE ENCOUNTER
ANTICOAGULATION  MANAGEMENT    Assessment     Today's INR result of 3.3 is Supratherapeutic (goal INR of 2.0-3.0)        Warfarin taken as previously instructed    No new diet changes affecting INR    No new medication/supplements affecting INR    Continues to tolerate warfarin with no reported s/s of bleeding or thromboembolism     Previous INR was Therapeutic    Plan:     Left a detailed message for Adrien regarding INR result and instructed:     Warfarin Dosing Instructions:  Continue current warfarin dose 2.5 mg daily on Mondays, Wednesday and Fridays; and 5 mg daily rest of week  (0 % change)    Instructed patient to follow up no later than: 1-2 weeks.    Education provided:     Instructed to call the [facility] for any changes, questions or concerns. (#[phone])   ?   Mary Alice Santamaria RN    Subjective/Objective:      Adrien Parra, a 74 y.o. male is on warfarin.     Adrien reports:     Home warfarin dose: as updated on anticoagulation calendar per template     Missed doses: No     Medication changes:  No     S/S of bleeding or thromboembolism:  No     New Injury or illness:  No     Changes in diet or alcohol consumption:  No     Upcoming surgery, procedure or cardioversion:  No    Anticoagulation Episode Summary     Current INR goal:   2.0-3.0   TTR:   76.5 % (4.5 y)   Next INR check:   7/8/2019   INR from last check:   3.30! (6/24/2019)   Weekly max warfarin dose:      Target end date:      INR check location:      Preferred lab:      Send INR reminders to:   KEM HARRISON    Indications    ASD (atrial septal defect) [Q21.1]  A-fib (H) [I48.91]           Comments:            Anticoagulation Care Providers     Provider Role Specialty Phone number    Pradeep Darden MD Referring Internal Medicine [phone]

## 2021-05-30 ENCOUNTER — RECORDS - HEALTHEAST (OUTPATIENT)
Dept: ADMINISTRATIVE | Facility: CLINIC | Age: 76
End: 2021-05-30

## 2021-05-30 NOTE — TELEPHONE ENCOUNTER
ANTICOAGULATION  MANAGEMENT PROGRAM    Adrien Parra is overdue for INR check.  Reminder call made.    Spoke with Donald who declined to schedule INR at this time.  If calling back, please schedule INR check as soon as possible.    Lauren Alejandro RN

## 2021-05-30 NOTE — TELEPHONE ENCOUNTER
ANTICOAGULATION  MANAGEMENT    Assessment     Today's INR result of 2.5 is Therapeutic (goal INR of 2.0-3.0)        Warfarin taken as previously instructed    No new diet changes affecting INR    No new medication/supplements affecting INR    Continues to tolerate warfarin with no reported s/s of bleeding or thromboembolism     Previous INR was Therapeutic    Plan:     Spoke with Adrien regarding INR result and instructed:     Warfarin Dosing Instructions:  Continue current warfarin dose 2.5 mg daily on Mondays, Wednesdays and Fridays; and 5 mg daily rest of week  (0 % change)    Instructed patient to follow up no later than: one month.    Education provided: importance of therapeutic range, importance of following up for INR monitoring at instructed interval and importance of taking warfarin as instructed    Donald verbalizes understanding and agrees to warfarin dosing plan.    Instructed to call the [facility] for any changes, questions or concerns. (#[phone])   ?   Mary Alice Santamaria RN    Subjective/Objective:      Adrien Parra, a 74 y.o. male is on warfarin.     Adrien reports:     Home warfarin dose: verbally confirmed home dose with Donald and updated on anticoagulation calendar     Missed doses: No     Medication changes:  No     S/S of bleeding or thromboembolism:  Yes: had one bloody nose that was easily stopped     New Injury or illness:  No     Changes in diet or alcohol consumption:  No     Upcoming surgery, procedure or cardioversion:  No    Anticoagulation Episode Summary     Current INR goal:   2.0-3.0   TTR:   76.3 % (4.5 y)   Next INR check:   8/20/2019   INR from last check:   2.50 (7/23/2019)   Weekly max warfarin dose:      Target end date:      INR check location:      Preferred lab:      Send INR reminders to:   KEM HARRISON    Indications    ASD (atrial septal defect) [Q21.1]  A-fib (H) [I48.91]           Comments:            Anticoagulation Care Providers     Provider Role  Specialty Phone number    Pradeep Darden MD Referring Internal Medicine [phone]

## 2021-05-31 VITALS — HEIGHT: 68 IN | WEIGHT: 253 LBS | BODY MASS INDEX: 38.34 KG/M2

## 2021-05-31 NOTE — TELEPHONE ENCOUNTER
ANTICOAGULATION  MANAGEMENT    Assessment     Today's INR result of 2.3 is Therapeutic (goal INR of 2.0-3.0)        Warfarin taken as previously instructed    Change in alcohol intake may be affecting INR    No new medication/supplements affecting INR    Continues to tolerate warfarin with no reported s/s of bleeding or thromboembolism     Previous INR was Therapeutic    Plan:     Spoke with Adrien regarding INR result and instructed:     Warfarin Dosing Instructions:  Continue current warfarin dose 2.5 mg daily on Mondays, Wednesdays and Fridays; and 5 mg daily rest of week  (0 % change)    Instructed patient to follow up no later than: one month.    Education provided: importance of consistent vitamin K intake, impact of vitamin K foods on INR, potential interaction between warfarin and alcohol, importance of therapeutic range, importance of following up for INR monitoring at instructed interval and importance of taking warfarin as instructed    Donald verbalizes understanding and agrees to warfarin dosing plan.    Instructed to call the [facility] for any changes, questions or concerns. (#[phone])   ?   Mary Alice Santamaria RN    Subjective/Objective:      Adrien Parra, a 74 y.o. male is on warfarin.     Adrien reports:     Home warfarin dose: verbally confirmed home dose with Donald and updated on anticoagulation calendar     Missed doses: No     Medication changes:  No     S/S of bleeding or thromboembolism:  No     New Injury or illness:  No     Changes in diet or alcohol consumption:  Yes: he is no longer drinking any alcohol and avoiding carbs due to his blood sugars.     Upcoming surgery, procedure or cardioversion:  No    Anticoagulation Episode Summary     Current INR goal:   2.0-3.0   TTR:   76.7 % (4.6 y)   Next INR check:   9/20/2019   INR from last check:   2.30 (8/23/2019)   Weekly max warfarin dose:      Target end date:      INR check location:      Preferred lab:      Send INR reminders  to:   KEM HARRISON    Indications    ASD (atrial septal defect) [Q21.1]  A-fib (H) [I48.91]           Comments:            Anticoagulation Care Providers     Provider Role Specialty Phone number    Pradeep Darden MD Referring Internal Medicine [phone]

## 2021-06-01 NOTE — TELEPHONE ENCOUNTER
ANTICOAGULATION  MANAGEMENT    Assessment     Today's INR result of 2..3 is Therapeutic (goal INR of 2.0-3.0)        Warfarin taken as previously instructed    Change in alcohol intake may be affecting INR    No new medication/supplements affecting INR    Continues to tolerate warfarin with no reported s/s of bleeding or thromboembolism     Previous INR was Therapeutic    Plan:     Left a detailed message for Adrien regarding INR result and instructed:     Warfarin Dosing Instructions:  Continue current warfarin dose 2.5 mg daily on Mondays, Wednesdays and Fridays; and 5 mg daily rest of week  (0 % change)    Instructed patient to follow up no later than: one month.    Education provided:     Instructed to call the [facility] for any changes, questions or concerns. (#[phone])   ?   Mary Alice Santamaria RN    Subjective/Objective:      Adrien Parra, a 74 y.o. male is on warfarin.     Adrien reports:     Home warfarin dose: as updated on anticoagulation calendar per template     Missed doses: No     Medication changes:  No     S/S of bleeding or thromboembolism:  No     New Injury or illness:  No     Changes in diet or alcohol consumption:  Yes: no alcohol     Upcoming surgery, procedure or cardioversion:  No    Anticoagulation Episode Summary     Current INR goal:   2.0-3.0   TTR:   85.4 %   Next INR check:   10/22/2019   INR from last check:   2.30 (9/24/2019)   Weekly max warfarin dose:      Target end date:      INR check location:      Preferred lab:      Send INR reminders to:   KEM HARRISON    Indications    ASD (atrial septal defect) [Q21.1]  A-fib (H) [I48.91]           Comments:            Anticoagulation Care Providers     Provider Role Specialty Phone number    Pradeep Darden MD Referring Internal Medicine [phone]

## 2021-06-01 NOTE — PATIENT INSTRUCTIONS - HE
Establish with new physician in [place], especially for INR checks and warfarin management.    Continue to work on diet and weight loss.    Goal blood pressure less than 135/85.    Recheck INR later this month, before you leave for [place].

## 2021-06-01 NOTE — PROGRESS NOTES
[facility] Follow Up Note    Adrien Parra   74 y.o. male    Date of Visit: 10/1/2019    Chief Complaint   Patient presents with     Follow-up     Medication Refill     Subjective  Donald is here for a final checkup before he goes to [place] in November.  He is moving close to his daughter and grandchildren.    He has paroxysmal atrial fibrillation with low burden since a Maze procedure with ASD and tricuspid regurgitation repair in 2005.  He has a pacemaker for sick sinus syndrome since 2013.    On chronic warfarin.  No symptom medic palpitations.    I reviewed the echo from August 2019, when he was down seeing his cardiologist at [facility].    Mild RV enlargement and decreased RV systolic function but slightly improved from previous.  Mild to moderate tricuspid regurgitation, ejection fraction 61%, no recurrent shunt.    Blood pressure was borderline high at that cardiology appointment at 173/98 noted.    Patient states his blood pressure has been borderline high in the 130-140/80-90 range.  He is had borderline elevated blood pressures in the past.    He is been somewhat resistant to changing his medication, and he is losing weight and working on exercise, and feels he can improve things that way.    Still on lisinopril 10 mg twice a day.  Occasionally he has been taking only half a pill of lisinopril in the morning because he does get some mild orthostasis and fatigue when he takes it on empty stomach.    Still on HCTZ 25 mg a day.    He has chronic lymphedema, greater on the left leg, stable for many years.  Wears compression stockings.    Related to venous insufficiency with the morbid obesity.    I suspected sleep apnea in the past but he did have an overnight oximetry study in 2018 and it was negative for sleep apnea.    He is never smoked.  No new cough or increasing shortness of breath.    2005 cardiac catheterization negative for coronary artery disease.    Heart condition  presumed secondary to his atrial septal defect and tricuspid issue in the past.    He does have type 2 diabetes but does not want to start medication.  Hemoglobin A1c was rechecked in August at his [facility] appointment and was found to be 7.0%.  In May of this year was also 7.0% with a creatinine of 0.8.    He did see his ophthalmologist today and no retinopathy noted.    Past history of squamous cell cancer in situ, was seen last fall, has an appointment later this month with dermatology.  No new skin lesions noted.    Colonoscopy in 2010 was negative and refuses further colonoscopy.    I did review additional lab work from August 8 at [facility] with a normal TSH, AST 27, hemoglobin 14, creatinine 0.8 and potassium 4.0.   and HDL of 48 with triglycerides 79.    No increasing shortness of breath    Warfarin has been therapeutic and INR 2.3 on September 24    PMHx:  No past medical history on file.  PSHx:  No past surgical history on file.  Immunizations:   Immunization History   Administered Date(s) Administered     Influenza, Seasonal, Inj PF IIV3 09/14/2012     Influenza, inj, historic,unspecified 10/05/2009, 10/04/2011, 09/18/2016     Influenza, seasonal,quad inj 6-35 mos 10/03/2010     Pneumo Conj 13-V (2010&after) 04/14/2015     Pneumo Polysac 23-V 10/03/2010     Td,adult,historic,unspecified 10/30/2009     Tdap 05/18/2014     ZOSTER, LIVE 01/01/2000       ROS A comprehensive review of systems was performed and was otherwise negative    Medications, allergies, and problem list were reviewed and updated    Exam  /84 (Patient Site: Right Arm, Patient Position: Sitting, Cuff Size: Adult Large)   Pulse 88   Wt (!) 237 lb 11.2 oz (107.8 kg)   BMI 35.88 kg/m    Appears well.  Alert and oriented, no neurologic changes.  No jaundice.  Lungs are clear.  Heart is regular with pacemaker.  No murmur or gallop.  Abdomen is nontender, still +1-2 left leg edema and trace to +1 on the right.    I did  recheck the blood pressure and it was 142/82    Assessment/Plan  1. Controlled type 2 diabetes mellitus without complication, without long-term current use of insulin (H)  Borderline diet controlled.  I did discuss starting metformin, he again declines.  He will continue to work on diet and weight loss.  Follow-up with new physician in [place].    Ophthalmology today with no retinopathy, recheck in 1 year    He also has cataracts developing, recheck in 1 year with ophthalmology    2. HTN (hypertension)  Borderline elevated blood pressure.  Continue work on diet and weight loss.    Could consider increase in lisinopril.    Avoiding calcium channel blockers with chronic edema.    Could consider change to furosemide twice a day or higher dose of HCTZ.      - hydroCHLOROthiazide (HYDRODIURIL) 25 MG tablet; TAKE 1 TABLET (25 MG TOTAL) BY MOUTH DAILY.  Dispense: 90 tablet; Refill: 3  - lisinopril (PRINIVIL,ZESTRIL) 10 MG tablet; Take 1 tablet (10 mg total) by mouth 2 (two) times a day.  Dispense: 180 tablet; Refill: 3  - potassium chloride (K-DUR,KLOR-CON) 20 MEQ tablet; TAKE 1 TABLET BY MOUTH DAILY.  Dispense: 90 tablet; Refill: 3    3. Paroxysmal atrial fibrillation (H)  Asymptomatic.  Low burden.  Pacemaker.    Chronic warfarin  - warfarin (COUMADIN/JANTOVEN) 5 MG tablet; TAKE ONE TABLET BY MOUTH DAILY. ADJUST DOSE BASED ON INR RESULTS AS DIRECTED.  Dispense: 90 tablet; Refill: 0    4. History of gout  No recent recurrence  - allopurinol (ZYLOPRIM) 100 MG tablet; Take 1 tablet (100 mg total) by mouth daily.  Dispense: 180 tablet; Refill: 3    He is already had the flu shot.    History of skin cancer, dermatology follow-up for yearly check later this month.    History of borderline low platelets, likely associate with his chronic lymphedema.  Was stable on check last year.  No new bruising or bleeding issues.    Chronic lymphedema stable.    Return in about 2 months (around 12/1/2019) for Recheck.   Patient  Instructions   Establish with new physician in [place], especially for INR checks and warfarin management.    Continue to work on diet and weight loss.    Goal blood pressure less than 135/85.    Recheck INR later this month, before you leave for [place].    Pradeep Darden MD        Current Outpatient Medications   Medication Sig Dispense Refill     allopurinol (ZYLOPRIM) 100 MG tablet Take 1 tablet (100 mg total) by mouth daily. 180 tablet 3     hydroCHLOROthiazide (HYDRODIURIL) 25 MG tablet TAKE 1 TABLET (25 MG TOTAL) BY MOUTH DAILY. 90 tablet 3     lisinopril (PRINIVIL,ZESTRIL) 10 MG tablet Take 1 tablet (10 mg total) by mouth 2 (two) times a day. 180 tablet 3     potassium chloride (K-DUR,KLOR-CON) 20 MEQ tablet TAKE 1 TABLET BY MOUTH DAILY. 90 tablet 3     triamcinolone (KENALOG) 0.1 % ointment Apply to affected areas twice a day 80 g 1     warfarin (COUMADIN/JANTOVEN) 5 MG tablet TAKE ONE TABLET BY MOUTH DAILY. ADJUST DOSE BASED ON INR RESULTS AS DIRECTED. 90 tablet 0     No current facility-administered medications for this visit.      Allergies   Allergen Reactions     Tetracyclines      Venom-Honey Bee Unknown     Social History     Tobacco Use     Smoking status: Never Smoker     Smokeless tobacco: Never Used   Substance Use Topics     Alcohol use: Not on file     Drug use: Not on file

## 2021-06-02 ENCOUNTER — RECORDS - HEALTHEAST (OUTPATIENT)
Dept: ADMINISTRATIVE | Facility: CLINIC | Age: 76
End: 2021-06-02

## 2021-06-02 VITALS — BODY MASS INDEX: 39.25 KG/M2 | WEIGHT: 259 LBS | HEIGHT: 68 IN

## 2021-06-02 NOTE — TELEPHONE ENCOUNTER
10/7-submitted online to [facility], they will contact the patient to schedule an appointment.

## 2021-06-02 NOTE — TELEPHONE ENCOUNTER
Question following Office Visit  When did you see your provider: 10/1/19  What is your question: At the end of the visit I mentioned I was having some right heel pain and Pradeep Darden MD told me to let him know if it does not get better. I am now having trouble walking. Please advised.  Okay to leave a detailed message: Yes

## 2021-06-02 NOTE — TELEPHONE ENCOUNTER
ANTICOAGULATION  MANAGEMENT PROGRAM    Adrien Parra is overdue for INR check.  Reminder call made.    Left message for Adrien. If returning call, please schedule INR check as soon as possible.    Mary Alice Santamaria RN

## 2021-06-02 NOTE — TELEPHONE ENCOUNTER
Patient notified of clinician's message and verbalized understanding. No further questions at this time.   Lacie Tse CMA ............... 10:36 AM, 10/07/19

## 2021-06-03 VITALS — BODY MASS INDEX: 37.28 KG/M2 | WEIGHT: 247 LBS

## 2021-06-03 VITALS
HEART RATE: 88 BPM | DIASTOLIC BLOOD PRESSURE: 84 MMHG | WEIGHT: 237.7 LBS | BODY MASS INDEX: 35.88 KG/M2 | SYSTOLIC BLOOD PRESSURE: 158 MMHG

## 2021-06-04 NOTE — TELEPHONE ENCOUNTER
ANTICOAGULATION  MANAGEMENT PROGRAM    Adrien Parra is overdue for INR check.     Was unable to reach patient and was unable to leave a voicemail. If patient calls, please schedule INR appointment as soon as possible.      First reminder letter sent

## 2021-06-04 NOTE — TELEPHONE ENCOUNTER
ANTICOAGULATION  MANAGEMENT PROGRAM    Adrien Parra is being discharged from the [facility] Anticoagulation Management Program (AC).    Reason for discharge: care has been transferred to out of state provider    ACM referral closed, anticoagulation episode resolved and INR standing order discontinued.     If Adrien needs warfarin management in the future, please send a new referral.    Shyann Kern RN

## 2021-06-04 NOTE — TELEPHONE ENCOUNTER
ANTICOAGULATION MANAGEMENT PROGRAM--Non-Compliance Chart Review    Adrien Parra is overdue for INR follow up.      INR Results:   Lab Results   Component Value Date    INR 2.30 (H) 09/24/2019    INR 2.30 (H) 08/23/2019    INR 2.50 (H) 07/23/2019         Chart reviewed, continue non-compliance protocol       Shyann Kern RN

## 2021-06-04 NOTE — TELEPHONE ENCOUNTER
Who is calling:  Patient    Reason for Call:  Patient notifying clinic that he has moved out of state and no longer sees [facility] for primary care.  Patient keep getting letters regarding his anticoagulation management.    Date of last appointment with primary care: 10/01/19    Okay to leave a detailed message: Yes

## 2021-06-09 ENCOUNTER — RECORDS - HEALTHEAST (OUTPATIENT)
Dept: ADMINISTRATIVE | Facility: CLINIC | Age: 76
End: 2021-06-09

## 2021-06-14 NOTE — PROGRESS NOTES
Assessment and Plan:       1. Health care maintenance  Main issue is obesity and poor medical compliance.    Cardiac history and diet-controlled diabetes are his main medical issues.    Moderate suspicion for sleep apnea but is not want to do a study.    He had a flu shot earlier this fall    He refuses colon cancer screening.    2.  Paroxysmal versus chronic atrial fibrillation, with some intermittent palpitations and rapid heart rate spells.    I stressed the importance to patient that he needs to be managed closely by cardiology for this.  He states is going to be managed at [facility] cardiology.  And asked that records be sent to me.    I discussed risk of progressive heart failure with sub-adequately control of his heart rate and atrial fibrillation.  I did suggest adding Toprol-XL for better rate control.  He needs cardiac echo and pacemaker evaluation.  He stated he would have that with cardiology    Continue long-term Coumadin.  Goal INR 2-3.    - warfarin (COUMADIN) 5 MG tablet; TAKE ONE TABLET BY MOUTH DAILY. ADJUST DOSE BASED ON INR RESULTS AS DIRECTED.  Dispense: 90 tablet; Refill: 3  - INR    3. HTN (hypertension)  Blood pressure on the low side today.  He states it usually runs around 130/70.  Continue current blood pressure medication.  - potassium chloride SA (K-DUR,KLOR-CON) 20 MEQ tablet; TAKE 1 TABLET BY MOUTH DAILY.  Dispense: 90 tablet; Refill: 3  - lisinopril (PRINIVIL,ZESTRIL) 10 MG tablet; Take 1 tablet (10 mg total) by mouth 2 (two) times a day.  Dispense: 180 tablet; Refill: 3  - hydroCHLOROthiazide (HYDRODIURIL) 25 MG tablet; TAKE 1 TABLET (25 MG TOTAL) BY MOUTH DAILY.  Dispense: 90 tablet; Refill: 3    4. History of gout  No recurrence  - allopurinol (ZYLOPRIM) 100 MG tablet; Take 1 tablet (100 mg total) by mouth daily.  Dispense: 180 tablet; Refill: 3    5. Controlled type 2 diabetes mellitus without complication, without long-term current use of insulin  I again suggested  consideration of metformin medication.  Is highly resistant to that.  If his hemoglobin A1c gets above 7% I would have him consider.  - Glycosylated Hemoglobin A1c  - Lipid Cascade  - Microalbumin, Random Urine    He is overdue for his yearly eye exam, he was reminded to make an appointment    I stressed the importance of good foot care and to not shave his own calluses.  I strongly suggested seeing podiatry, but he refused.    6. Medication monitoring encounter    - Comprehensive Metabolic Panel  - HM2(CBC w/o Differential)    7. Fatigue, unspecified type  Chronic.  I suspect sleep apnea.  Consider sleep study in the future, currently declining further evaluation.  - Thyroid Stimulating Hormone (TSH)    8. Prostate cancer screening    - PSA (Prostatic-Specific Antigen), Annual Screen    9. 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  Possibly some platelet consumption in the leg with chronic edema and intermittent inflammation.  If platelets get below 50, will need referral to hematology.    10. History of skin cancer  Follow-up with dermatology next week as planned.    Chronic lower extremity lymphedema related to obesity and venous insufficiency.  Patient declined referral to the lymphedema clinic.  I warned strongly on risk of recurrent cellulitis of his left shin especially with the abrasion.  Seek medical attention immediately if increasing redness or swelling or drainage.  Consider referral to lymphedema clinic in the future, if he is willing to see them.    Rash on back consistent with an irritative contact dermatitis, does not appear to be bothering him now.  Can use triamcinolone topically.      The patient's current medical problems were reviewed.    I have had an Advance Directives discussion with the patient.  Discussed CODE STATUS with patient, full code but no prolonged artificial life support.  I encouraged him to discuss healthcare directive further with his wife.  The following health  maintenance schedule was reviewed with the patient and provided in printed form in the after visit summary:   Health Maintenance   Topic Date Due     DIABETES OPHTHALMOLOGY EXAM  06/10/1955     DIABETES URINE MICROALBUMIN  06/10/1955     ADVANCE DIRECTIVES DISCUSSED WITH PATIENT  06/10/1962     DIABETES HEMOGLOBIN A1C  05/18/2017     DIABETES FOLLOW-UP  06/01/2018     DIABETES FOOT EXAM  12/01/2018     FALL RISK ASSESSMENT  12/01/2018     COLONOSCOPY  11/16/2020     TD 18+ HE  05/18/2024     PNEUMOCOCCAL POLYSACCHARIDE VACCINE AGE 65 AND OVER  Completed     INFLUENZA VACCINE RULE BASED  Completed     PNEUMOCOCCAL CONJUGATE VACCINE FOR ADULTS (PCV13 OR PREVNAR)  Completed     ZOSTER VACCINE  Completed        Subjective:   Chief Complaint: Adrien Parra is an 72 y.o. male here for an Annual Wellness visit.     HPI:  Adrien is here for adult wellness visit and follow-up on multiple medical issues.  Patient has been very resistant to regular follow-up in the past.  He follows up intermittently.  He has not had good compliance with follow-up.  Patient is a pharmacist, he also receives medical care done at [facility] but has not been back recently for cardiology care.    Patient has a past history of paroxysmal atrial fibrillation despite Maze procedure in 2005.  He had an atrial septal defect repair and tricuspid regurgitation repair at that time.  Patient has been told at [facility] that his atrial fibrillation spells are fairly infrequent about every 2-3 months.  I do not have independent confirmation of that, however.  Patient does have spells with intermittent elevated heart rate and fatigue that he describes as his paroxysmal atrial fibrillation episodes.  He states they last a few hours to half a day and he gets them about every 2 months.  Patient refuses to consider beta-blocker despite his pacemaker.  I did discuss risk of progressive heart failure with incomplete rate control with his atrial  fibrillation.  Patient states he plans to see [facility] again this winter did not want a referral here locally for further evaluation to this clinic.    He had AV charu pauses and dual-chamber pacemaker placement in 2013 at [facility].  It has been a number of years he states since he has been evaluated and had a pacemaker check.    He remains on Coumadin and no TIA or stroke symptoms.  He had transient amnesia episode in 2011 of unclear etiology.    He has had chronic lower extremity edema with venous insufficiency especially the left leg.  History of cellulitis with recurrence and may treat with Keflex.  Was a small open area there is using moisturizer cream on.  More aggressive compression stockings and does not want a referral.    Moderate suspicion for sleep apnea with morbid obesity but he has not wanted to get a sleep study.    Hypercholesterolemia moderate degree but he refuses to consider a statin drug.  LDL cholesterol 128 last year with HDL 48.    Diabetes type 2 is been diet controlled, but blood sugars have been somewhat higher recently.  One year ago hemoglobin A1c was 6.8%.  He does not check blood sugars.  He does not wish to improve diet significantly, still enjoys beer on a regular basis, eats pasta and high starchy foods.  He has not seen the eye doctor in 2 years, no acute vision changes.  No foot sores, but he does do his own callus abrasion, does have pre-ulcerative calluses.  He refuses to see podiatry.    No recent gout, on low-dose allopurinol.    Past history of squamous cell cancer of the back in 2011.  Recently had a basal cell cancer of the left arm, is going back to dermatology next week to complete the treatment for that.    Patient previous colon polyp.  November 2010 colonoscopy negative.  Patient now refuses further colonoscopies.  I did discuss option for Cologuard, but patient declines.    Status post cholecystectomy 2011.    History of thrombocytopenia, chronic.  Previous  diagnosis of ITP with platelets stable around 90 for years.    Chronic stasis dermatitis of the legs, has used triamcinolone and moisturizer cream.  Treat with Keflex for cellulitis in May of this year.    Review of Systems:    Please see above.  The rest of the review of systems are negative for all systems.    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   No past surgical history on file.   No family history on file.   Social History     Social History     Marital status:      Spouse name: N/A     Number of children: N/A     Years of education: N/A     Occupational History     Not on file.     Social History Main Topics     Smoking status: Never Smoker     Smokeless tobacco: Not on file     Alcohol use Not on file     Drug use: Not on file     Sexual activity: Not on file     Other Topics Concern     Not on file     Social History Narrative      Current Outpatient Prescriptions   Medication Sig Dispense Refill     allopurinol (ZYLOPRIM) 100 MG tablet Take 1 tablet (100 mg total) by mouth daily. 180 tablet 3     hydroCHLOROthiazide (HYDRODIURIL) 25 MG tablet TAKE 1 TABLET (25 MG TOTAL) BY MOUTH DAILY. 90 tablet 3     lisinopril (PRINIVIL,ZESTRIL) 10 MG tablet Take 1 tablet (10 mg total) by mouth 2 (two) times a day. 180 tablet 3     potassium chloride SA (K-DUR,KLOR-CON) 20 MEQ tablet TAKE 1 TABLET BY MOUTH DAILY. 90 tablet 3     warfarin (COUMADIN) 5 MG tablet TAKE ONE TABLET BY MOUTH DAILY. ADJUST DOSE BASED ON INR RESULTS AS DIRECTED. 90 tablet 3      "triamcinolone (KENALOG) 0.1 % ointment Apply to affected areas twice a day 80 g 1     No current facility-administered medications for this visit.       Objective:   Vital Signs:   Visit Vitals     /72     Pulse (!) 112     Ht 5' 8\" (1.727 m)     Wt (!) 253 lb (114.8 kg)     SpO2 98%     BMI 38.47 kg/m2        VisionScreening:  No exam data present     PHYSICAL EXAM  Obese male.  Alert and oriented ×3.  Normal mood and affect.  Pupils and irises equal and reactive.  Extra muscles intact.  No jaundice or conjunctivitis.  External ears and nose exam is normal and tympanic membranes are normal.  Pharynx is mildly crowded with moderate neck adiposity.  No pharyngitis.  No other oral lesions or leukoplakia.  Teeth in adequate condition.  No cervical or supraclavicular or axillary or inguinal adenopathy.  No JVD and no carotid bruits.  No thyromegaly or nodularity.  Lungs are clear to auscultation with good respiratory excursion.  He has some mild bilateral back contact dermatitis, likely from rubbing on clothing.  He has numerous seborrheic keratoses.  Healing lesion on his left forearm with a remove the basal cell.  Heart is irregularly irregular alternating with regularity consistent with his pacemaker.  I did not hear murmur rub or gallop.  +1 ankle edema of the left leg and trace at the right.  Chronic stasis dermatitis changes of the left leg feet.  Small abrasion in the center of the left shin, superficial and not secondarily infected.  Moderate calluses on his feet, he had abraded 1 of the calluses on his right second toe to the point of causing some bleeding, however.  It is not infected.  Abdomen is morbidly obese, nontender no hepatosplenomegaly or pulsatile mass.  Gait within normal limits.  He declined the  exam and rectal exam.    Assessment Results 12/1/2017   Activities of Daily Living No help needed   Instrumental Activities of Daily Living No help needed   Get Up and Go Score Less than 12 seconds "   Mini Cog Total Score 5   Some recent data might be hidden     A Mini-Cog score of 0-2 suggests the possibility of dementia, score of 3-5 suggests no dementia    Identified Health Risks:     Patient's advanced directive was discussed and I am comfortable with the patient's wishes.

## 2021-06-16 PROBLEM — Z85.828 HISTORY OF SKIN CANCER: Status: ACTIVE | Noted: 2017-12-01

## 2021-06-19 NOTE — LETTER
Letter by Pradeep Darden MD at      Author: Pradeep Darden MD Service: -- Author Type: --    Filed:  Encounter Date: 5/15/2019 Status: (Other)         Adrien Parra  [address]  [facility] [address]             May 15, 2019         Dear Mr. Parra,    Below are the results from your recent visit:    Resulted Orders   Basic Metabolic Panel   Result Value Ref Range    Sodium 134 (L) 136 - 145 mmol/L    Potassium 4.1 3.5 - 5.0 mmol/L    Chloride 100 98 - 107 mmol/L    CO2 25 22 - 31 mmol/L    Anion Gap, Calculation 9 5 - 18 mmol/L    Glucose 130 (H) 70 - 125 mg/dL    Calcium 9.1 8.5 - 10.5 mg/dL    BUN 21 8 - 28 mg/dL    Creatinine 0.86 0.70 - 1.30 mg/dL    GFR MDRD Af Amer >60 >60 mL/min/1.73m2    GFR MDRD Non Af Amer >60 >60 mL/min/1.73m2    Narrative    Fasting Glucose reference range is 70-99 mg/dL per  American Diabetes Association (ADA) guidelines.   Glycosylated Hemoglobin A1c   Result Value Ref Range    Hemoglobin A1c 7.0 (H) 3.5 - 6.0 %       Kidney labs are normal.  Sodium level is okay.    Excellent control of diabetes.    No change in treatment plan.    Please call with questions or contact us using [place].    Sincerely,        Electronically signed by Pradeep Darden MD

## 2021-06-19 NOTE — LETTER
Letter by Pradeep Darden MD at      Author: Pradeep Darden MD Service: -- Author Type: --    Filed:  Encounter Date: 9/4/2019 Status: (Other)         Adrien Parra  [address]  [facility] [address]          September 4, 2019      Dear Mr. Parra,    This letter is to inform you that according to our records, you are overdue for a diabetic opthalmology exam. This is an exam recommended yearly to rule out diabetic retinopathy. If you have had this exam done, please contact us with the updated information of when and where your last visit was performed. Otherwise, please schedule this eye exam and remind your eye doctor to fax this information to our clinic so we can update your chart at [phone].        Sincerely,        Electronically signed by Pradeep Darden MD

## 2021-06-20 NOTE — LETTER
Letter by Mary Alice Santamaria RN at      Author: Mary Alice Santamaria RN Service: -- Author Type: --    Filed:  Encounter Date: 11/1/2019 Status: Signed         Adrien Parra  [address]  [facility] [address]      December 10, 2019      Dear Mr. Parra,    You are currently under the care of [facility] for your warfarin (Coumadin ) therapy.  We are contacting you because our records show you were due for an INR on 10/22/2019.    There are potentially serious risks when taking warfarin without careful monitoring and we want to make sure you are safely managed.  Routine INR monitoring is required for warfarin refills.     Please call [phone] as soon as possible to schedule an appointment.  If there has been a change in your care or other concerns, please let us know so we can help and or update our records.     Sincerely,       [facility]

## 2021-06-22 NOTE — PROGRESS NOTES
Assessment and Plan:       1. Healthcare maintenance  His main issue is working on obesity and diet and exercise plan.  His diabetes has been sub-adequately controlled but he is refused treatment for that.    Moderate cardiovascular risk with that and his hypercholesterolemia but he refuses to even consider statin drugs.  Likely in 2005 his cardiac Gracie Axel was negative for coronary artery disease and had a negative stress test earlier.    Patient is moving to [place] next year.  To be close to his grandson who just born  - DNR (Do Not Resuscitate, Includes DNI)    He refuses colon cancer screening    2. History of gout  No recurrence  - allopurinol (ZYLOPRIM) 100 MG tablet; Take 1 tablet (100 mg total) by mouth daily.  Dispense: 180 tablet; Refill: 3    3. HTN (hypertension)  Borderline controlled but had Mexican food last night.  He states it has been controlled at home and he does not wish to change medication.  He will work on diet and exercise and continue to follow blood pressure.  Follow-up in the spring.  - potassium chloride (K-DUR,KLOR-CON) 20 MEQ tablet; TAKE 1 TABLET BY MOUTH DAILY..  Dispense: 90 tablet; Refill: 3  - lisinopril (PRINIVIL,ZESTRIL) 10 MG tablet; Take 1 tablet (10 mg total) by mouth 2 (two) times a day.  Dispense: 180 tablet; Refill: 3  - hydroCHLOROthiazide (HYDRODIURIL) 25 MG tablet; TAKE 1 TABLET (25 MG TOTAL) BY MOUTH DAILY..  Dispense: 90 tablet; Refill: 3    Overnight oximetry study earlier this year was negative for sleep apnea    4. Paroxysmal atrial fibrillation (H)  Low burden on Holter monitor this past summer.  Heart rate control was adequate at that time.  He did not want to start a beta-blocker.  Cardiac ejection fraction normal and stable in July.    Continue long-term Coumadin.  INR today.  - warfarin (COUMADIN/JANTOVEN) 5 MG tablet; TAKE ONE TABLET BY MOUTH DAILY. ADJUST DOSE BASED ON INR RESULTS AS DIRECTED..  Dispense: 90 tablet; Refill: 3    5. Controlled  type 2 diabetes mellitus without complication, without long-term current use of insulin (H)  He refuses metformin.  I also discussed other options including GLP-1 agonists which he declines.  He did not want to see the diabetic educator.    He will work on diet and exercise.  Follow-up in the spring.    Yearly follow-up with ophthalmology in the fall  - Lipid Cascade  - Glycosylated Hemoglobin A1c  - Microalbumin, Random Urine    6. Prostate cancer screening    - PSA (Prostatic-Specific Antigen), Annual Screen    7. Medication monitoring encounter    - Comprehensive Metabolic Panel  - HM2(CBC w/o Differential)    8. History of skin cancer  He sees dermatology next week.  He states the dermatologist is aware of the sclerotic area in the back of his calf, and will have that examined next week.    9. Routine general medical examination at a health care facility  With chronic lower extremity edema the left leg.  Compression stockings.    History of low platelets, has been stable.  Checking platelets today.    The patient's current medical problems were reviewed.    I have had an Advance Directives discussion with the patient.  Patient does feel strongly that he wishes to be DNR/DNI.  He has discussed this with his family.  He does have a healthcare directive, he was told to send to the clinic to be scanned into his chart.  The following health maintenance schedule was reviewed with the patient and provided in printed form in the after visit summary:   Health Maintenance   Topic Date Due     DIABETES OPHTHALMOLOGY EXAM  06/10/1955     ZOSTER VACCINES (2 of 3) 02/26/2000     DIABETES HEMOGLOBIN A1C  06/01/2018     DIABETES FOLLOW-UP  06/01/2018     INFLUENZA VACCINE RULE BASED (1) 08/01/2018     DIABETES FOOT EXAM  12/01/2018     DIABETES URINE MICROALBUMIN  12/01/2018     FALL RISK ASSESSMENT  12/01/2018     COLONOSCOPY  11/16/2020     ADVANCE DIRECTIVES DISCUSSED WITH PATIENT  12/01/2022     TD 18+ HE  05/18/2024      PNEUMOCOCCAL POLYSACCHARIDE VACCINE AGE 65 AND OVER  Completed     PNEUMOCOCCAL CONJUGATE VACCINE FOR ADULTS (PCV13 OR PREVNAR)  Completed        Subjective:   Chief Complaint: Adrien Parra is an 73 y.o. male here for an Annual Wellness visit.   HPI: Retired pharmacist executive.  Here for adult wellness visit.  He also goes to see the [facility], and I did review those notes today.    He saw me one year ago for a physical.    Diabetes type 2 remains diet controlled.  He is refused to start metformin.  In December 2017 hemoglobin A1c was 6.8%.  In July at [facility] it was 7.5%.  He is obese and does not always get his regular exercise.  He does go to the local gym and exercises on the elliptical machine and treadmill periodically.    He has good exertional ability.  No chest pain or chest pressure or worsening shortness of breath.  He had a negative stress test in July at [facility].  Cardiac echo in July showed ejection fraction 61%.  He had a slight RV dilatation and decreased function which was stable.    Patient had a previous atrial septal defect repair with tricuspid regurgitation repair and Maze procedure in 2005.    He has had some recurrent atrial fibrillation since.  He had a pacemaker, dual-chamber placed in 2013 for some sinus node incompetence.    He had a Holter monitor done in July through the [facility].  Atrial fibrillation burden was only 0.2%.  Heart rate control is actually fairly good with just brief spells of rapid heart rates.    He denies significant alcohol.    He denies any worsening palpitations.  No chest pain with activity.    2005 angiogram prior to his heart surgery was negative for coronary artery disease.    He did have an overnight oximetry study earlier this year at [facility].  That was negative for hypoxia.  He had a negative sleep evaluation back in 2009 as well.  Normal TSH a year ago.  He denies significant daytime sleepiness.    He did see ophthalmology about 3  months ago, denies retinopathy and given a 1 year follow-up.    He denies any foot sores.  He does shave his own calluses and does not want to see podiatry.    He does have chronic left lower extremity lymphedema wears compression stockings.  He has had recurrent cellulitis there in the past.  He did have an episode of cellulitis in February of this year after a skin biopsy in February with dermatology.  He has had history of skin cancers including basal cell of the arm last year and previous back squamous cell cancer.  He sees dermatology next week.    No recurrent gout on low-dose allopurinol.    No heartburn or dyspepsia.  Cholecystectomy in 2011.  Bowels are regular with no blood in stool.    Colonoscopy November 2010 was negative, but he did have a polyp in the past.  He refuses further colon cancer screening.    Urinating normally perhaps 1-2 times a night nocturia.  No hematuria.  PSA was 1.0 a year ago.    No headache complaints.  No sinusitis complaints.  No new cough or wheezing.    He did have some Mexican food yesterday.    His blood pressure at home is been running in the 120s-140s over 70s-90s.  He is on lisinopril 10 mg twice daily and HCTZ 25 mg a day.  A year ago was 112/72.  In July was 133/81    He has had some mild ITP with low platelets in the past with a platelet of 109 in December.  No other abnormal bleeding.        Review of Systems:    Please see above.  The rest of the review of systems are negative for all systems.    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   No past surgical history on file.   No family history on file.   Social History     Socioeconomic History     Marital status:      Spouse name: Not on file     Number of children: Not on file     Years of education: Not on file     Highest education level: Not on file   Social Needs     Financial resource strain: Not on file     Food insecurity - worry: Not on file     Food insecurity - inability: Not on file     Transportation needs - medical: Not on file     Transportation needs - non-medical: Not on file   Occupational History     Occupation: Retired   Tobacco Use     Smoking status: Never Smoker     Smokeless tobacco: Never Used   Substance and Sexual Activity     Alcohol use: Not on file     Drug use: Not on file     Sexual activity: Not on file   Other Topics Concern     Not on file   Social History Narrative     Not on file      Current Outpatient Medications   Medication Sig Dispense Refill     allopurinol (ZYLOPRIM) 100 MG tablet Take 1 tablet (100 mg total) by mouth daily. 180 tablet 3     hydroCHLOROthiazide (HYDRODIURIL) 25 MG tablet TAKE 1 TABLET (25 MG TOTAL) BY MOUTH DAILY.. 90 tablet 3     lisinopril (PRINIVIL,ZESTRIL) 10 MG tablet Take 1 tablet (10 mg total) by mouth 2 (two) times a day. 180 tablet 3     potassium chloride (K-DUR,KLOR-CON) 20 MEQ tablet TAKE 1 TABLET BY MOUTH DAILY.. 90 tablet 3     triamcinolone (KENALOG) 0.1 % ointment Apply to affected areas twice a day 80 g 1     warfarin (COUMADIN/JANTOVEN) 5 MG tablet TAKE ONE TABLET BY MOUTH DAILY. ADJUST DOSE BASED ON INR RESULTS AS DIRECTED.. 90 tablet 3     No current facility-administered medications for this visit.       Objective:   Vital Signs:   Visit Vitals  /88 (Patient Site: Right Arm, Patient Position: Sitting, Cuff Size: Adult Large)   Pulse 88   Ht 5' 8.25\" (1.734 m)   Wt (!) 259 lb (117.5 kg)   BMI 39.09 kg/m         VisionScreening:  No exam data present     PHYSICAL EXAM  Alert and " oriented x3 with normal mood and affect.  Pupils and irises equal and reactive.  Extraocular muscles intact.  No jaundice or conjunctivitis.  External ears and nose exam is normal.  Tympanic membranes are normal.    Word recall is normal.  Clock face drawing is normal.    Get up and go test is normal.    Pharynx does not appear significant a crowded and no pharyngitis.  Teeth in adequate condition.  No thrush.  No cervical or supraclavicular or inguinal adenopathy.  No JVD and no carotid bruits.  No thyromegaly or nodularity.  Lungs clear to auscultation with normal respiratory excursion.  Spine is straight.  Heart is regular with occasional premature beat.  I did not hear murmur or gallop.  He has +1 lower extremity edema on the left with chronic stasis dermatitis pigment changes.  He does have a keratotic plug on his left posterior calf but without inflammation or evidence of infection.  He has a small clot from a blood blister on his left great toe but again no inflammation and that looks old.  Minimal pre-ulcerative calluses on the bottom of his feet, well cared for, no ulcer or evidence of infection.  Sensation intact.  Abdomen is morbidly obese but nontender.  No hepatospleno megaly or pulsatile mass.  He has a right epididymal cyst it is not tender.  Testicles otherwise normal.  No inguinal hernia.  He declined a rectal exam.  Gait within normal limits.    Assessment Results 12/1/2017   Activities of Daily Living No help needed   Instrumental Activities of Daily Living No help needed   Get Up and Go Score Less than 12 seconds   Mini Cog Total Score 5   Some recent data might be hidden     A Mini-Cog score of 0-2 suggests the possibility of dementia, score of 3-5 suggests no dementia    Identified Health Risks:     Patient's advanced directive was discussed and I am comfortable with the patient's wishes.

## 2021-06-23 NOTE — TELEPHONE ENCOUNTER
ANTICOAGULATION  MANAGEMENT    Assessment     Today's INR result of 1.9 is Subtherapeutic (goal INR of 2.0-3.0)        Warfarin taken as previously instructed    Increased greens/vitamin K intake may be affecting INR    No new medication/supplements affecting INR    Continues to tolerate warfarin with no reported s/s of bleeding or thromboembolism     Previous INR was Therapeutic    Plan:     Spoke with Adrien regarding INR result and instructed:     Warfarin Dosing Instructions:  Take 5 mg today then continue current warfarin dose 2.5 mg daily on Mondays, Wednesdays and Fridays; and 5 mg daily rest of week  (0 % change)    Instructed patient to follow up no later than: 1-2 weeks.    Education provided: importance of consistent vitamin K intake, impact of vitamin K foods on INR, potential interaction between warfarin and alcohol, importance of therapeutic range, importance of following up for INR monitoring at instructed interval and importance of taking warfarin as instructed    Donald verbalizes understanding and agrees to warfarin dosing plan.    Instructed to call the [facility] for any changes, questions or concerns. (#[phone])   ?   Mary Alice Santamaria RN    Subjective/Objective:      Adrien Parra, a 73 y.o. male is on warfarin.     Adrien reports:     Home warfarin dose: verbally confirmed home dose with Donald and updated on anticoagulation calendar     Missed doses: No     Medication changes:  No     S/S of bleeding or thromboembolism:  No     New Injury or illness:  No     Changes in diet or alcohol consumption:  Yes: ate spinach and had less alcohol this past week. This is not something that will continue.     Upcoming surgery, procedure or cardioversion:  No    Anticoagulation Episode Summary     Current INR goal:   2.0-3.0   TTR:   75.9 % (4 y)   Next INR check:   2/6/2019   INR from last check:   1.90! (1/23/2019)   Weekly max warfarin dose:      Target end date:      INR check location:       Preferred lab:      Send INR reminders to:   ANTICOAGULATION POOL A (WBY,WBE,MID,RSC)    Indications    ASD (atrial septal defect) [Q21.1]  A-fib (H) [I48.91]           Comments:            Anticoagulation Care Providers     Provider Role Specialty Phone number    Pradeep Darden MD Referring Internal Medicine [phone]

## 2021-06-23 NOTE — TELEPHONE ENCOUNTER
Anticoagulation Annual Referral Renewal Review    Adrien Parra's chart reviewed for annual renewal of referral to anticoagulation monitoring.        Criteria for anticoagulation nurse and/or pharmacist renewal met   Warfarin indication: Atrial Fibrillation Yes, per indication   Current with INR monitoring/compliant Yes Yes   Date of last office visit 12/06/18 Yes, had office visit within last year   Time in Therapeutic Range (TTR) 94.74 % Yes, TTR > 60%       Adrien Parra met all criteria for anticoagulation management program initiated renewal.  New INR standing orders and anticoagulation referral renewal placed.      Mary Alice Santamaria RN  2:36 PM

## 2021-06-24 NOTE — TELEPHONE ENCOUNTER
ANTICOAGULATION  MANAGEMENT    Assessment     Today's INR result of 2.1 is Therapeutic (goal INR of 2.0-3.0)        Warfarin taken as previously instructed    Change in alcohol intake may be affecting INR    No new medication/supplements affecting INR    Continues to tolerate warfarin with no reported s/s of bleeding or thromboembolism     Previous INR was Subtherapeutic    Plan:     Left a detailed message for Adrien regarding INR result and instructed:     Warfarin Dosing Instructions:  Continue current warfarin dose 2.5 mg daily on Mondays, Wednesdays and Fridays; and 5 mg daily rest of week  (0 % change)    Instructed patient to follow up no later than: one month    Education provided:    Instructed to call the [facility] for any changes, questions or concerns. (#[phone])   ?   Mary Alice Santamaria RN    Subjective/Objective:      Adrien Parra, a 73 y.o. male is on warfarin.     Adrien reports:     Home warfarin dose: as updated on anticoagulation calendar per template     Missed doses: No     Medication changes:  No     S/S of bleeding or thromboembolism:  No     New Injury or illness:  No     Changes in diet or alcohol consumption:  Yes: he has been drinking less alcohol.     Upcoming surgery, procedure or cardioversion:  No    Anticoagulation Episode Summary     Current INR goal:   2.0-3.0   TTR:   75.7 % (4.1 y)   Next INR check:   2/6/2019   INR from last check:   2.20 (2/25/2019)   Weekly max warfarin dose:      Target end date:      INR check location:      Preferred lab:      Send INR reminders to:   ANTICOAGULATION POOL A (WBY,WBE,MID,RSC)    Indications    ASD (atrial septal defect) [Q21.1]  A-fib (H) [I48.91]           Comments:            Anticoagulation Care Providers     Provider Role Specialty Phone number    Pradeep Darden MD Referring Internal Medicine [phone]